# Patient Record
Sex: MALE | Race: WHITE | NOT HISPANIC OR LATINO | URBAN - METROPOLITAN AREA
[De-identification: names, ages, dates, MRNs, and addresses within clinical notes are randomized per-mention and may not be internally consistent; named-entity substitution may affect disease eponyms.]

---

## 2023-08-23 ENCOUNTER — OFFICE (OUTPATIENT)
Dept: URBAN - METROPOLITAN AREA CLINIC 27 | Facility: CLINIC | Age: 63
End: 2023-08-23

## 2023-08-23 VITALS
TEMPERATURE: 97.3 F | HEART RATE: 51 BPM | HEIGHT: 69 IN | WEIGHT: 205 LBS | DIASTOLIC BLOOD PRESSURE: 79 MMHG | SYSTOLIC BLOOD PRESSURE: 152 MMHG

## 2023-08-23 DIAGNOSIS — R10.13 EPIGASTRIC PAIN: ICD-10-CM

## 2023-08-23 DIAGNOSIS — R10.10 UPPER ABDOMINAL PAIN, UNSPECIFIED: ICD-10-CM

## 2023-08-23 DIAGNOSIS — Z87.19 PERSONAL HISTORY OF OTHER DISEASES OF THE DIGESTIVE SYSTEM: ICD-10-CM

## 2023-08-23 PROCEDURE — 99204 OFFICE O/P NEW MOD 45 MIN: CPT | Performed by: INTERNAL MEDICINE

## 2023-08-24 ENCOUNTER — AMBULATORY SURGICAL CENTER (OUTPATIENT)
Dept: URBAN - METROPOLITAN AREA SURGERY 12 | Facility: SURGERY | Age: 63
End: 2023-08-24

## 2023-08-24 ENCOUNTER — AMBULATORY SURGICAL CENTER (OUTPATIENT)
Dept: URBAN - METROPOLITAN AREA SURGERY 12 | Facility: SURGERY | Age: 63
End: 2023-08-24
Payer: COMMERCIAL

## 2023-08-24 ENCOUNTER — OFFICE (OUTPATIENT)
Dept: URBAN - METROPOLITAN AREA PATHOLOGY 2 | Facility: PATHOLOGY | Age: 63
End: 2023-08-24

## 2023-08-24 VITALS
SYSTOLIC BLOOD PRESSURE: 153 MMHG | DIASTOLIC BLOOD PRESSURE: 49 MMHG | DIASTOLIC BLOOD PRESSURE: 43 MMHG | SYSTOLIC BLOOD PRESSURE: 120 MMHG | SYSTOLIC BLOOD PRESSURE: 100 MMHG | RESPIRATION RATE: 15 BRPM | DIASTOLIC BLOOD PRESSURE: 43 MMHG | RESPIRATION RATE: 11 BRPM | DIASTOLIC BLOOD PRESSURE: 48 MMHG | DIASTOLIC BLOOD PRESSURE: 49 MMHG | DIASTOLIC BLOOD PRESSURE: 63 MMHG | OXYGEN SATURATION: 96 % | SYSTOLIC BLOOD PRESSURE: 101 MMHG | TEMPERATURE: 97.3 F | DIASTOLIC BLOOD PRESSURE: 65 MMHG | DIASTOLIC BLOOD PRESSURE: 65 MMHG | HEART RATE: 59 BPM | OXYGEN SATURATION: 98 % | SYSTOLIC BLOOD PRESSURE: 149 MMHG | HEART RATE: 56 BPM | DIASTOLIC BLOOD PRESSURE: 39 MMHG | HEART RATE: 54 BPM | SYSTOLIC BLOOD PRESSURE: 101 MMHG | RESPIRATION RATE: 11 BRPM | RESPIRATION RATE: 9 BRPM | RESPIRATION RATE: 15 BRPM | WEIGHT: 200 LBS | HEART RATE: 53 BPM | HEART RATE: 54 BPM | DIASTOLIC BLOOD PRESSURE: 61 MMHG | DIASTOLIC BLOOD PRESSURE: 39 MMHG | SYSTOLIC BLOOD PRESSURE: 120 MMHG | DIASTOLIC BLOOD PRESSURE: 63 MMHG | DIASTOLIC BLOOD PRESSURE: 48 MMHG | SYSTOLIC BLOOD PRESSURE: 100 MMHG | HEART RATE: 56 BPM | DIASTOLIC BLOOD PRESSURE: 63 MMHG | SYSTOLIC BLOOD PRESSURE: 131 MMHG | HEART RATE: 59 BPM | RESPIRATION RATE: 11 BRPM | RESPIRATION RATE: 15 BRPM | HEART RATE: 53 BPM | OXYGEN SATURATION: 99 % | OXYGEN SATURATION: 96 % | RESPIRATION RATE: 9 BRPM | RESPIRATION RATE: 4 BRPM | DIASTOLIC BLOOD PRESSURE: 65 MMHG | OXYGEN SATURATION: 99 % | DIASTOLIC BLOOD PRESSURE: 61 MMHG | HEIGHT: 69 IN | HEART RATE: 52 BPM | HEART RATE: 53 BPM | DIASTOLIC BLOOD PRESSURE: 39 MMHG | RESPIRATION RATE: 16 BRPM | SYSTOLIC BLOOD PRESSURE: 131 MMHG | SYSTOLIC BLOOD PRESSURE: 149 MMHG | RESPIRATION RATE: 16 BRPM | RESPIRATION RATE: 4 BRPM | SYSTOLIC BLOOD PRESSURE: 131 MMHG | RESPIRATION RATE: 16 BRPM | RESPIRATION RATE: 4 BRPM | WEIGHT: 200 LBS | HEART RATE: 52 BPM | TEMPERATURE: 97.3 F | SYSTOLIC BLOOD PRESSURE: 153 MMHG | DIASTOLIC BLOOD PRESSURE: 43 MMHG | HEART RATE: 54 BPM | SYSTOLIC BLOOD PRESSURE: 97 MMHG | HEART RATE: 55 BPM | HEART RATE: 52 BPM | SYSTOLIC BLOOD PRESSURE: 101 MMHG | OXYGEN SATURATION: 100 % | OXYGEN SATURATION: 96 % | WEIGHT: 200 LBS | OXYGEN SATURATION: 100 % | SYSTOLIC BLOOD PRESSURE: 120 MMHG | HEART RATE: 56 BPM | OXYGEN SATURATION: 99 % | OXYGEN SATURATION: 95 % | OXYGEN SATURATION: 95 % | SYSTOLIC BLOOD PRESSURE: 153 MMHG | OXYGEN SATURATION: 98 % | SYSTOLIC BLOOD PRESSURE: 97 MMHG | DIASTOLIC BLOOD PRESSURE: 49 MMHG | SYSTOLIC BLOOD PRESSURE: 97 MMHG | HEIGHT: 69 IN | HEART RATE: 59 BPM | SYSTOLIC BLOOD PRESSURE: 149 MMHG | DIASTOLIC BLOOD PRESSURE: 48 MMHG | OXYGEN SATURATION: 95 % | TEMPERATURE: 97.3 F | HEART RATE: 55 BPM | RESPIRATION RATE: 9 BRPM | DIASTOLIC BLOOD PRESSURE: 61 MMHG | OXYGEN SATURATION: 98 % | OXYGEN SATURATION: 100 % | HEIGHT: 69 IN | SYSTOLIC BLOOD PRESSURE: 100 MMHG | HEART RATE: 55 BPM

## 2023-08-24 DIAGNOSIS — K31.7 POLYP OF STOMACH AND DUODENUM: ICD-10-CM

## 2023-08-24 DIAGNOSIS — K44.9 DIAPHRAGMATIC HERNIA WITHOUT OBSTRUCTION OR GANGRENE: ICD-10-CM

## 2023-08-24 DIAGNOSIS — K29.80 DUODENITIS WITHOUT BLEEDING: ICD-10-CM

## 2023-08-24 DIAGNOSIS — R10.13 EPIGASTRIC PAIN: ICD-10-CM

## 2023-08-24 PROCEDURE — 43239 EGD BIOPSY SINGLE/MULTIPLE: CPT | Performed by: INTERNAL MEDICINE

## 2023-08-24 PROCEDURE — G8907 PT DOC NO EVENTS ON DISCHARG: HCPCS | Performed by: INTERNAL MEDICINE

## 2023-08-24 PROCEDURE — 88342 IMHCHEM/IMCYTCHM 1ST ANTB: CPT | Performed by: PATHOLOGY

## 2023-08-24 PROCEDURE — 88305 TISSUE EXAM BY PATHOLOGIST: CPT | Performed by: PATHOLOGY

## 2023-08-24 PROCEDURE — 88313 SPECIAL STAINS GROUP 2: CPT | Performed by: PATHOLOGY

## 2023-10-11 RX ORDER — METOPROLOL SUCCINATE 25 MG/1
25 TABLET, EXTENDED RELEASE ORAL DAILY
COMMUNITY
Start: 2023-10-02

## 2023-10-11 RX ORDER — DICYCLOMINE HYDROCHLORIDE 20 MG/1
20 TABLET ORAL 4 TIMES DAILY
COMMUNITY
Start: 2023-07-12 | End: 2024-01-22 | Stop reason: ALTCHOICE

## 2023-10-11 RX ORDER — IPRATROPIUM BROMIDE 21 UG/1
2 SPRAY, METERED NASAL 3 TIMES DAILY
COMMUNITY
Start: 2023-09-19

## 2023-10-11 RX ORDER — OMEPRAZOLE 40 MG/1
40 CAPSULE, DELAYED RELEASE ORAL DAILY
COMMUNITY
Start: 2023-08-24 | End: 2024-02-19 | Stop reason: WASHOUT

## 2023-10-12 ENCOUNTER — OFFICE VISIT (OUTPATIENT)
Dept: SURGERY | Facility: CLINIC | Age: 63
End: 2023-10-12
Payer: COMMERCIAL

## 2023-10-12 VITALS
WEIGHT: 200 LBS | HEIGHT: 69 IN | DIASTOLIC BLOOD PRESSURE: 80 MMHG | SYSTOLIC BLOOD PRESSURE: 128 MMHG | TEMPERATURE: 96.6 F | BODY MASS INDEX: 29.62 KG/M2

## 2023-10-12 DIAGNOSIS — K80.20 CALCULUS OF GALLBLADDER WITHOUT CHOLECYSTITIS WITHOUT OBSTRUCTION: Primary | ICD-10-CM

## 2023-10-12 PROCEDURE — 99203 OFFICE O/P NEW LOW 30 MIN: CPT | Performed by: SURGERY

## 2023-10-12 PROCEDURE — 1036F TOBACCO NON-USER: CPT | Performed by: SURGERY

## 2023-10-12 NOTE — PROGRESS NOTES
Subjective   Patient ID: Vincent Berardinelli is a 63 y.o. male who presents for New Patient Visit (cholelithiasis).  HPI    Review of Systems   All other systems reviewed and are negative.      Objective   Physical Exam    Assessment/Plan        **Portions of this medical record have been created using voice recognition software and may have minor errors which are inherent in voice recognition systems. It has not been fully edited for typographical or grammatical errors**

## 2023-10-12 NOTE — PROGRESS NOTES
Subjective   Patient ID: Vincent Berardinelli is a 63 y.o. male who presents for New Patient Visit (cholelithiasis).    HPI multiple episodes of epigastric, right upper quadrant pain.  The patient had extensive GI work-up, findings were consistent with symptomatic cholelithiasis    Review of Systems currently review of all 10 system is negative.    Physical Exam  Pupils equal bilaterally, mucosa moist, bilateral breath sounds, regular rhythm and rate, abdomen soft, nontender, palpable peripheral pulses, no focal neurological motor deficits.  Musculoskeletal exam within normal limits, ENT exam within normal limits        Objective     Ultrasound consistent with cholelithiasis with circumferential gallbladder wall thickening.  Small cysts in the liver.  Bilirubin within normal limits.  WBC within normal limits.  EGD showed 1 cm hiatal hernia.  Normal mucosa, small polyps in the stomach.    No diagnosis found.   There is no problem list on file for this patient.     No Known Allergies   Medication Documentation Review Audit       Reviewed by Thu Munguia MA (Medical Assistant) on 10/12/23 at 0830      Medication Order Taking? Sig Documenting Provider Last Dose Status   dicyclomine (Bentyl) 20 mg tablet [id number] No Take 1 tablet (20 mg) by mouth 4 times a day. Historical Provider, MD Not Taking Active   ipratropium (Atrovent) 21 mcg (0.03 %) nasal spray [id number] Yes Administer 2 sprays into each nostril 3 times a day. Historical MD Ji Taking Active   metoprolol succinate XL (Toprol-XL) 25 mg 24 hr tablet [id number] Yes Take 1 tablet (25 mg) by mouth once daily. Charanjit Workman MD Taking Active   omeprazole (PriLOSEC) 40 mg DR capsule [id number] Yes Take 1 capsule (40 mg) by mouth once daily. Historical MD Ji Taking Active                    No past medical history on file.  Social History     Tobacco Use   Smoking Status Never   Smokeless Tobacco Never     No family history on file.   No past  surgical history on file.    Assessment/Plan   The patient with symptomatic cholelithiasis.  The patient has indication for laparoscopic, possible open cholecystectomy.  Risks, benefits, alternative treatment were explained to the patient.  All questions were answered.  Informed consent was obtained.        Julito Carson MD

## 2023-11-30 ENCOUNTER — OFFICE VISIT (OUTPATIENT)
Dept: SURGERY | Facility: CLINIC | Age: 63
End: 2023-11-30
Payer: COMMERCIAL

## 2023-11-30 DIAGNOSIS — Z90.49 STATUS POST LAPAROSCOPIC CHOLECYSTECTOMY: Primary | ICD-10-CM

## 2023-11-30 PROCEDURE — 99024 POSTOP FOLLOW-UP VISIT: CPT | Performed by: PHYSICIAN ASSISTANT

## 2023-11-30 PROCEDURE — 1036F TOBACCO NON-USER: CPT | Performed by: PHYSICIAN ASSISTANT

## 2023-11-30 NOTE — PROGRESS NOTES
Subjective   Patient ID: Vincent Berardinelli is a 63 y.o. male who presents for Post-op (Lap ryan w grams poss open done on 11/15/23).    HPI  This is a 63-year-old male status post a laparoscopic cholecystectomy.  Patient is doing well without complaints.  He has no pain.  No nausea no vomiting.  Is moving his bowels well.  Is not having any fevers.      Review of Systems  Negative other than mentioned in HPI    ENT: No earache, no sore throat, no nosebleeds  Cardiovascular: No chest pain, no shortness of breath, no leg pain, no edema  Respiratory: No shortness of breath on exertion, no wheezing  Gastrointestinal: No abdominal pain, no melena, no nausea, vomiting and/or diarrhea  Musculoskeletal: No pain moving all extremities, no back pain ambulating normally  Skin: No rashes, no lesions, and no skin changes  Neuro: No headache, no confusion, no numbness and tingling  Psychiatric, normal mood, not suicidal, not homicidal, feeling good          Physical Exam  Eyes: Conjunctiva non -icteric and eye lids are without obvious rash or drooping. Pupils are symmetric.   Ears, Nose, Mouth, and Throat: External ears and nose appear to be without deformity or rash. No lesions or masses noted. Hearing is grossly intact.   Neck:. No JVD noted, tracheal position is midline. No thyromegaly, no thyroid nodules  Head and Face: Examination of the head and face revealed no abnormalities.   Respiratory: No gasping or shortness of breath noted, no use of accessory muscles noted. Clear to auscultate bilaterally  Cardiovascular: Examination for edema is normal. Regular rate and rhythm S1 S2 without murmurs  GI: Abdomen non tender to palpation, bowel sounds present no hepatosplenomegaly visions are clean dry and intact no erythema no swelling no drainage.  Glue removed today.  Skin: No rashes or open lesions/ulcers identified on skin.   Musk: Digits/nails show no clubbing or cyanosis. No asymmetry or masses noted of the musculature.  Examination of the muscles/joints/bones show normal range of motion. Gait is grossly normally.   Neurologic: Cranial nerves II- XII intact, motor strength 5/5 muscle strength of the lower extremities bilaterally and equal.     Objective     No diagnosis found.   There is no problem list on file for this patient.     No Known Allergies   Medication Documentation Review Audit       Reviewed by Thu Munguia MA (Medical Assistant) on 11/30/23 at 1055      Medication Order Taking? Sig Documenting Provider Last Dose Status   dicyclomine (Bentyl) 20 mg tablet [id number] No Take 1 tablet (20 mg) by mouth 4 times a day. Historical Provider, MD Not Taking Active   ipratropium (Atrovent) 21 mcg (0.03 %) nasal spray [id number] No Administer 2 sprays into each nostril 3 times a day. Historical Provider, MD Taking Active   metoprolol succinate XL (Toprol-XL) 25 mg 24 hr tablet [id number] No Take 1 tablet (25 mg) by mouth once daily. Historical Provider, MD Taking Active   omeprazole (PriLOSEC) 40 mg DR capsule [id number] No Take 1 capsule (40 mg) by mouth once daily. Historical Provider, MD Taking Active                    History reviewed. No pertinent past medical history.  Social History     Tobacco Use   Smoking Status Never   Smokeless Tobacco Never     No family history on file.   History reviewed. No pertinent surgical history.    Assessment/Plan   No lifting over 10 to 12 pounds for 4 weeks  No swimming pools hot tubs or lakes for 2 weeks  You may ride a stationary bike, you may use a treadmill, you may walk outside.  No squats, sit ups or lunges  You may drive a car  Follow-up as needed     Encounter Diagnosis   Name Primary?    Status post laparoscopic cholecystectomy Yes     I have reviewed all data including labs,radiologic and previous reports.          **Portions of this medical record have been created using voice recognition software and may have minor errors which are inherent in voice recognition systems. It has  not been fully edited for typographical or grammatical errors**

## 2023-12-13 ENCOUNTER — OFFICE (OUTPATIENT)
Dept: URBAN - METROPOLITAN AREA CLINIC 27 | Facility: CLINIC | Age: 63
End: 2023-12-13
Payer: COMMERCIAL

## 2023-12-13 VITALS — TEMPERATURE: 97.5 F | HEIGHT: 69 IN | WEIGHT: 194 LBS

## 2023-12-13 DIAGNOSIS — R10.13 EPIGASTRIC PAIN: ICD-10-CM

## 2023-12-13 PROCEDURE — 99214 OFFICE O/P EST MOD 30 MIN: CPT | Performed by: INTERNAL MEDICINE

## 2024-01-22 ENCOUNTER — OFFICE VISIT (OUTPATIENT)
Dept: OTOLARYNGOLOGY | Facility: CLINIC | Age: 64
End: 2024-01-22
Payer: COMMERCIAL

## 2024-01-22 ENCOUNTER — EVALUATION (OUTPATIENT)
Dept: SPEECH THERAPY | Facility: CLINIC | Age: 64
End: 2024-01-22
Payer: COMMERCIAL

## 2024-01-22 VITALS — TEMPERATURE: 96.8 F | WEIGHT: 195.7 LBS | BODY MASS INDEX: 28.9 KG/M2

## 2024-01-22 DIAGNOSIS — J38.02 BILATERAL VOCAL CORD PARESIS: Primary | ICD-10-CM

## 2024-01-22 DIAGNOSIS — R09.89 CHRONIC THROAT CLEARING: ICD-10-CM

## 2024-01-22 DIAGNOSIS — R49.0 HOARSENESS: ICD-10-CM

## 2024-01-22 DIAGNOSIS — R09.82 PND (POST-NASAL DRIP): ICD-10-CM

## 2024-01-22 DIAGNOSIS — R49.0 HOARSENESS OF VOICE: Primary | ICD-10-CM

## 2024-01-22 DIAGNOSIS — J38.3 GLOTTIC INSUFFICIENCY: ICD-10-CM

## 2024-01-22 DIAGNOSIS — J38.02 BILATERAL VOCAL CORD PARESIS: ICD-10-CM

## 2024-01-22 PROCEDURE — 1036F TOBACCO NON-USER: CPT | Performed by: OTOLARYNGOLOGY

## 2024-01-22 PROCEDURE — 31579 LARYNGOSCOPY TELESCOPIC: CPT | Performed by: OTOLARYNGOLOGY

## 2024-01-22 PROCEDURE — 92524 BEHAVRAL QUALIT ANALYS VOICE: CPT | Mod: GN

## 2024-01-22 PROCEDURE — 99204 OFFICE O/P NEW MOD 45 MIN: CPT | Performed by: OTOLARYNGOLOGY

## 2024-01-22 ASSESSMENT — PAIN - FUNCTIONAL ASSESSMENT: PAIN_FUNCTIONAL_ASSESSMENT: 0-10

## 2024-01-22 ASSESSMENT — PAIN SCALES - GENERAL: PAINLEVEL_OUTOF10: 0 - NO PAIN

## 2024-01-22 NOTE — PROGRESS NOTES
Speech-Language Pathology    Voice Evaluation    Patient Name: Vincent Berardinelli  MRN: [id number]  Today's Date: 1/22/2024     Time Calculation  Start Time: 1010  Stop Time: 1040  Time Calculation (min): 30 min      Current Problem:  Patient Active Problem List   Diagnosis    Bilateral vocal cord paresis    Hoarseness     Pain Assessment:  Pain Assessment: 0-10  Pain Score: 0 - No pain    IMPRESSIONS  Patient presents with dysphonia 2/2 a diagnosis of bilateral vocal cord paresis (L > R) and compensatory MTD. Patient appears to be a candidate for therapy which will target vocal wellness and voice rebalancing.    Voice quality based on the GRBAS scale: 0=absent; 1=mild; 2=moderate; 3=severe    rdGrdrrdarddrderd:rd rd3rd Roughness: 2    Breathiness: 2    Asthenia: 0    Strain: 2    CONTRIBUTING FACTORS  Supraglottic compression/MTD  Decreased neuromuscular control of speech/swallow muscles  Habitual behaviors that misuse/abuse the voice  Abnormal vibratory mechanism secondary to physical changes    SUBJECTIVE  HISTORY/REASON FOR REFERRAL  This is a request for consultation from Dr. Solomon for Vincent Berardinelli, who is a 63 y.o. male presenting for an initial visit for hoarseness, throat clearing, cough, and runny nose.    Hoarseness:  The patient states that he has to clear his voice frequently. He works in sales with high professional voice demands. He also often loses his voice during meetings. Nothing has been helping to improve symptoms. Onset was over 1 year ago. The hoarseness occurred gradually. Voice currently is characterized as moderate breathy hoarseness with decreased volume. This impacts his work activities. He had an intubation in November 2023, which did not affect his voice.     Swallow: Denies swallowing issues. He was started on omeprazole in August 2023 and has been taking it daily.     Breathing: Denies breathing issues except for occasional shortness of breath that but is not impacting his daily  activities.     Other factors:  Throat clearing, postnasal drip. He has been using Atrovent for postnasal drip. He endorses some hand tremors that have not affected his handwriting. He recently had a normal hearing test.      ASSESSMENT  PERCEPTUAL VOICE FEATURES    Intonation: WFL  Loudness: reduced; able to increase with reported effort  Nasal resonance: WFL    Additional vocal characteristics noted included:  Throat clears    FLEXIBLE LARYNGOSCOPY WITH STROBOSCOPY   Vocal fold mobility: normal  Glottic closure: incomplete with a moderate mid glottal gap extending the full length of the vocal cords  Mucosal wave: increased amplitude bilaterally (L > R)  Secretions: unremarkable  Supraglottic compression: AP and FVC (R > L)  Vascularity: bilateral posterior edema and erythema  Mucosal edge: smooth bilaterally  Interarytenoid edema: none  Pharyngeal contraction: reduced (L > R)    ADDITIONAL OBSERVATIONS  Improved glottic sufficiency with cued increased phonatory effort    TREATMENT  Instructed patient this date in:  cough/throat clear reduction techniques (effortful swallow, RTB, Joselyn, silent cough/clear)  seltzer or warm water gargle technique to safely remove mucus  vocal wellness strategies to reduce cough/throat clear triggers and phono trauma  reflux lifestyle modifications    Clinician modeled all techniques and accurate patient follow through confirmed.  Handouts emailed/provided to facilitate optimal home carryover.    PLAN OF CARE  Frequency: 2-4 x/month  Duration: 2 months    LONG TERM GOALS  Improve overall vocal health to foster increased participation levels at home, work and in the community environment.    SHORT TERM GOALS  Patient will increase vocal wellness and decrease phono trauma in adherence with clinician prescribed vocal hygiene and wellness program per patient report 80% of his/her day.  Patient will increase ability to produce voice without tension within 5 minute conversational task x  80% accuracy as judged by clinician observation and/or patient report.  Patient will demonstrate independent use of voice techniques x 80% accuracy.  Patient will increase the balance/strength of the respiratory/laryngeal musculature x 80% accuracy.    RECOMMENDATIONS FOR THERAPEUTIC INTERVENTIONS  Speech/voice exercises  Vocal hygiene program    POTENTIAL FOR IMPROVEMENT  Guarded     FACTORS AFFECTING PROGNOSIS  Diagnosis pf a moderate mid glottal insufficiency    DISCUSSED PLAN OF CARE WITH patient and MD  DISCUSSED RISK/BENEFITS WITH patient  PATIENT/CAREGIVER AGREEABLE WITH PLAN.

## 2024-01-22 NOTE — PROGRESS NOTES
Reason For Consult  New patient visit for hoarseness, throat clearing, cough, postnasal drip    HISTORY OF PRESENT ILLNESS:  This is a request for consultation from Dr. Solomon for Vincent Berardinelli, who is a 63 y.o. male presenting for an initial visit for hoarseness, throat clearing, cough, and runny nose.    Hoarseness:  The patient states that he has to clear his voice frequently. 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 He also often loses his voice during meetings. Nothing has been helping to improve symptoms.  Onset was over 1 year ago. The hoarseness occurred gradually. Voice has improved/worsened or stabilized since onset.  Voice currently is characterized as moderate breathy hoarseness with decreased volume. This impacts his work activities. He had an intubation in November 2023, which did not affect his voice.    Swallow: Denies swallowing issues.  Breathing: Denies breathing issues except for occasional shortness of breath that but is not impacting his daily activities.    Other factors:  Throat clearing, postnasal drip.  He has been using Atrovent for postnasal drip. He denies swallowing issues. He was started on omeprazole in August 2023 and has been taking it daily. He endorses some hand tremors that have not affected his handwriting. He recently had a normal hearing test. Coughing is worse      Past Medical History  He has no past medical history on file.    Surgical History  He has no past surgical history on file.  He has a history of  cholecystectomy     Social History  He reports that he has never smoked. He has never used smokeless tobacco. He reports that he does not drink alcohol and does not use drugs.    Allergies  Patient has no known allergies.    Review of Systems  All 10 systems were reviewed and negative except for above.      Physical Exam  CONSTITUTIONAL: Well developed, well nourished.    VOICE: Moderate breathy hoarseness with decreased volume  RESPIRATION:  Breathing comfortably, no stridor.    NEURO: Alert and oriented x3, cranial nerves II-XII intact and symmetric bilaterally.    EARS: Normal external ears, external auditory canals, decreased hearing to conversational voice.    NOSE: External nose midline, anterior rhinoscopy is normal with limited visualization to the anterior aspect of the interior turbinates. No lesions noted.     ORAL CAVITY/OROPHARYNX/LIPS: Normal mucous membranes, normal floor of mouth/tongue/OP, no masses or lesions are noted.    SKIN: Neck skin is without scar or injury.    PSYCH: Alert and oriented with appropriate mood and affect.        Last Recorded Vitals  Temperature 36 °C (96.8 °F), weight 88.8 kg (195 lb 11.2 oz).    Procedure  PROCEDURE NOTE:  Recommended flexible laryngoscopy/stroboscopy.  Risks, benefits,  and alternatives were explained.  They wish to proceed and provide verbal consent.     PROCEDURE:  Flexible laryngoscopy with stroboscopy, CPT 62100     POSTPROCEDURE DIAGNOSIS:    INDICATIONS: Inability to tolerate mirror exam or abnormal findings on mirror, Flexible Laryngoscopy/Stroboscopy performed to assess one of the followin. Diagnosis of symptomatic disorder involving the voice, swallow, upper aerodigestive tract, including ZENIA disorders, or  2. Preoperative evaluation of vocal cord function for individuals undergoing surgery where the RLN or vagus nerves are at risk of injury, or  3. Further evaluation of abnormalities of the upper aerodigestive tract discovered by another modality, such as CT, MRI, bronchoscopy or EGD    Description of Procedure:    After adequate afrin and lidocaine spray, I advanced the endoscope.  Visualization of the nasopharynx, vallecula, posterior pharyngeal walls, pyriform, epiglottis and post cricoid areas was unremarkable.  The following laryngeal findings were noted:    vocal cord movement was normal but asymmetric  closure was Moderate-size mid-glottal gap that runs the length of the  vocal cord somewhat variable with closure with increased loudness  Mucosal wave was increased bilaterally  Compression was increased AP > FVC   edema was  not present  interarytenoid edema is mild  lesions were not present  the subglottis was widely patent  Pharyngeal wall squeeze was decreased on the left > right    Procedure well tolerated.    ASSESSMENT AND PLAN:   This is an initial visit for chronic hoarseness, throat clearing, cough, postnasal drip with clinical findings notable for bilateral vocal cord paresis.  Secondary issues identified and managed on this patient visit include: throat clearing, and irritable larynx     Diagnoses are exacerbated by: discoordination and hearing changes    We discussed the treatment options to include, medical and surgical options.  We have decided to proceed as follows:  Recommended speech therapy with Jacquelin Chun.  Patient may be candidate for vocal cord injection in the future.  Follow up in pending the outcome of therapy.       Scribe Attestation  By signing my name below, IRosendo Scribe, attest that this documentation has been prepared under the direction and in the presence of Brigida Gee MD.

## 2024-01-22 NOTE — LETTER
January 22, 2024     Curly Marcus MD  [address]  [place]  [facility] [address]    Patient: Vincent Berardinelli   YOB: 1960   Date of Visit: 1/22/2024       Dear Dr. Curly Marcus MD:    Thank you for referring Vincent Berardinelli to me for evaluation. Below are my notes for this consultation.  If you have questions, please do not hesitate to call me. I look forward to following your patient along with you.       Sincerely,     Brigida Gee MD      CC: Miguel Solomon MD  ______________________________________________________________________________________    Reason For Consult  No chief complaint on file.    HISTORY OF PRESENT ILLNESS:  This is a request for consultation from *** for Vincent Berardinelli, who is a 63 y.o. male presenting for an initial visit for ***.  The patient reports ***.    Hoarseness:  Onset was ***.  This occurred suddenly/gradually.  Voice has improved/worsened or stabilized since onset.  Voice currently is characterized as ***.  Impacting daily living related to job/work/school/activities.    Swallow:  Is/is not impacted.  Worse with liquids/solids. Associated with coughing.  Pneumonias?  Breathing:  Issues/No issues.      Other factors:  throat clearing/cough.  Triggered by:  eating/talking/walking/excercising. Coughing is worse day/night/am or pm.  Snoring ***.   Associated fatigue.  Prior sleep study.    Past Medical History  He has no past medical history on file.  Surgical History  He has no past surgical history on file.     Social History  He reports that he has never smoked. He has never used smokeless tobacco. He reports that he does not drink alcohol and does not use drugs.    Allergies  Patient has no known allergies.    Review of Systems  All 10 systems were reviewed and negative except for above.      Physical Exam  CONSTITUTIONAL: Well developed, well nourished.    VOICE:   RESPIRATION: Breathing comfortably, no stridor.     NEURO: Alert and oriented x3, cranial nerves II-XII intact and symmetric bilaterally.    EARS: Normal external ears, external auditory canals, *** hearing to conversational voice.    NOSE: External nose midline, anterior rhinoscopy is normal with limited visualization to the anterior aspect of the interior turbinates. No lesions noted.     ORAL CAVITY/OROPHARYNX/LIPS: Normal mucous membranes, normal floor of mouth/tongue/OP, no masses or lesions are noted.    SKIN: Neck skin is *** scar or injury.    PSYCH: Alert and oriented with appropriate mood and affect.        Last Recorded Vitals  There were no vitals taken for this visit.    Procedure  PROCEDURE NOTE:  Recommended flexible laryngoscopy/stroboscopy.  Risks, benefits,  and alternatives were explained.  They wish to proceed and provide verbal consent.     PROCEDURE:  Flexible Laryngoscopy, CPT 66974 *** Flexible laryngoscopy with stroboscopy, CPT 93656 ***    POSTPROCEDURE DIAGNOSIS:    INDICATIONS: Inability to tolerate mirror exam or abnormal findings on mirror, Flexible Laryngoscopy/Stroboscopy performed to assess one of the followin. Diagnosis of symptomatic disorder involving the voice, swallow, upper aerodigestive tract, including ZENIA disorders, or  2. Preoperative evaluation of vocal cord function for individuals undergoing surgery where the RLN or vagus nerves are at risk of injury, or  3. Further evaluation of abnormalities of the upper aerodigestive tract discovered by another modality, such as CT, MRI, bronchoscopy or EGD    Description of Procedure:    After adequate afrin and lidocaine spray, I advanced the endoscope.  Visualization of the nasopharynx, vallecula, posterior pharyngeal walls, pyriform, epiglottis and post cricoid areas was unremarkable.  The following laryngeal findings were noted:    vocal cord movement was ***  closure was ***  Mucosal wave was/was not assessed *** increased/decreased  Compression was increased AP  FVC  ***  edema was  ***  interarytenoid edema ***  lesions were ***  the subglottis was widely patent  Pharyngeal wall squeeze was ***    Procedure well tolerated.   Relevant Results       Radiology, Laboratory and Pathology ***    ASSESSMENT AND PLAN:   This is an initial visit for *** with clinical findings notable for ***  Secondary issues identified and managed on this patient visit include:    Diagnoses are exacerbated by:    We discussed the treatment options to include, medical and surgical options.  We have decided to proceed as follows:   ***      Scribe Attestation  By signing my name below, I, Tamiko Fraser, attest that this documentation has been prepared under the direction and in the presence of Brigida Gee MD.

## 2024-01-23 ENCOUNTER — OFFICE VISIT (OUTPATIENT)
Dept: SURGERY | Facility: CLINIC | Age: 64
End: 2024-01-23
Payer: COMMERCIAL

## 2024-01-23 DIAGNOSIS — K40.20 BILATERAL INGUINAL HERNIA WITHOUT OBSTRUCTION OR GANGRENE, RECURRENCE NOT SPECIFIED: ICD-10-CM

## 2024-01-23 LAB
NON-UH HIE BUN: 18 MG/DL (ref 9–23)
NON-UH HIE CREATININE: 1 MG/DL (ref 0.6–1.1)
NON-UH HIE GFR AA: >60
NON-UH HIE GLOMERULAR FILTRATION RATE: >60 ML/MIN/1.73M?

## 2024-01-23 PROCEDURE — 1036F TOBACCO NON-USER: CPT | Performed by: PHYSICIAN ASSISTANT

## 2024-01-23 PROCEDURE — 99214 OFFICE O/P EST MOD 30 MIN: CPT | Performed by: PHYSICIAN ASSISTANT

## 2024-01-23 NOTE — PROGRESS NOTES
Subjective   Patient ID: Vincent Berardinelli is a 63 y.o. male who presents for Hernia.    HPI  Is a 63-year-old gentleman who is just 2 months status post laparoscopic cholecystectomy.  Patient states about a month ago he started noticing a right inguinal bulge he has to push it in and out he says it feels like there is water in there.  It causes him discomfort.  Patient has a history of a previous midline appendectomy where he has a old incision as well.  He is not having any urinary difficulties he does not have any prostate difficulties    Review of Systems  Negative other than mentioned in HPI    ENT: No earache, no sore throat, no nosebleeds  Cardiovascular: No chest pain, no shortness of breath, no leg pain, no edema  Respiratory: No shortness of breath on exertion, no wheezing  Gastrointestinal: No abdominal pain, no melena, no nausea, vomiting and/or diarrhea  Musculoskeletal: No pain moving all extremities, no back pain ambulating normally  Skin: No rashes, no lesions, and no skin changes  Neuro: No headache, no confusion, no numbness and tingling  Psychiatric, normal mood, not suicidal, not homicidal, feeling good        Physical Exam  Eyes: Conjunctiva non -icteric and eye lids are without obvious rash or drooping. Pupils are symmetric.   Ears, Nose, Mouth, and Throat: External ears and nose appear to be without deformity or rash. No lesions or masses noted. Hearing is grossly intact.   Neck:. No JVD noted, tracheal position is midline. No thyromegaly, no thyroid nodules  Head and Face: Examination of the head and face revealed no abnormalities.   Respiratory: No gasping or shortness of breath noted, no use of accessory muscles noted. Clear to auscultate bilaterally  Cardiovascular: Examination for edema is normal. Regular rate and rhythm S1 S2 without murmurs  GI: Abdomen non tender to palpation, bowel sounds present no hepatosplenomegaly  Inguinal:.  Patient does have a reducible right inguinal hernia  present and possible left as well.  Both testes are down  Skin: No rashes or open lesions/ulcers identified on skin.   Musk: Digits/nails show no clubbing or cyanosis. No asymmetry or masses noted of the musculature. Examination of the muscles/joints/bones show normal range of motion. Gait is grossly normally.   Neurologic: Cranial nerves II- XII intact, motor strength 5/5 muscle strength of the lower extremities bilaterally and equal.      Objective     No diagnosis found.   Patient Active Problem List   Diagnosis    Bilateral vocal cord paresis    Hoarseness      No Known Allergies   Medication Documentation Review Audit       Reviewed by Thu Munguia MA (Medical Assistant) on 01/23/24 at 0846      Medication Order Taking? Sig Documenting Provider Last Dose Status   Discontinued 01/22/24 0822   ipratropium (Atrovent) 21 mcg (0.03 %) nasal spray [id number] No Administer 2 sprays into each nostril 3 times a day. Historical Provider, MD Taking Active   metoprolol succinate XL (Toprol-XL) 25 mg 24 hr tablet [id number] No Take 1 tablet (25 mg) by mouth once daily. Historical Provider, MD Taking Active   omeprazole (PriLOSEC) 40 mg DR capsule [id number] No Take 1 capsule (40 mg) by mouth once daily. Historical Provider, MD Taking Active                    History reviewed. No pertinent past medical history.  Social History     Tobacco Use   Smoking Status Never   Smokeless Tobacco Never     No family history on file.   History reviewed. No pertinent surgical history.    Assessment/Plan   Patient does have a right inguinal hernia present on exam and possibly left.  Patient will require CT pelvis with IV and oral contrast to assess if he has bilateral inguinal hernias.  Patient will need to wait to have another abdominal surgery for at least 3 months before we can fix his hernias by the time we get the CT and follow-up appointment hopefully he can get them fixed within a few weeks.      Encounter Diagnosis   Name  Primary?    Bilateral inguinal hernia without obstruction or gangrene, recurrence not specified      I have reviewed all data including labs,radiologic and previous reports.      **Portions of this medical record have been created using voice recognition software and may have minor errors which are inherent in voice recognition systems. It has not been fully edited for typographical or grammatical errors**

## 2024-01-30 ENCOUNTER — TREATMENT (OUTPATIENT)
Dept: SPEECH THERAPY | Facility: CLINIC | Age: 64
End: 2024-01-30
Payer: COMMERCIAL

## 2024-01-30 DIAGNOSIS — J38.02 BILATERAL VOCAL CORD PARESIS: Primary | ICD-10-CM

## 2024-01-30 DIAGNOSIS — R49.0 HOARSENESS: ICD-10-CM

## 2024-01-30 PROCEDURE — 92507 TX SP LANG VOICE COMM INDIV: CPT | Mod: GN

## 2024-01-30 ASSESSMENT — PAIN SCALES - GENERAL: PAINLEVEL_OUTOF10: 0 - NO PAIN

## 2024-01-30 ASSESSMENT — PAIN - FUNCTIONAL ASSESSMENT: PAIN_FUNCTIONAL_ASSESSMENT: 0-10

## 2024-01-30 NOTE — PROGRESS NOTES
"Speech-Language Pathology    Outpatient Speech-Language Pathology Treatment     Patient Name: Vincent Berardinelli  MRN: [id number]  Today's Date: 1/30/2024     Time Calculation  Start Time: 0900  Stop Time: 0940  Time Calculation (min): 40 min      Current Problem:   1. Bilateral vocal cord paresis        2. Hoarseness          Pain Assessment:  Pain Assessment: 0-10  Pain Score: 0 - No pain    SUBJECTIVE  Patient alert and oriented and ready to participate in office visit this date.    OBJECTIVE  LONG TERM GOAL  The patient will improve coordination between the subsystems of respiration, phonation and articulation for functional speech production with a variety of communication partners across environmental and situational contexts.     SHORT TERM GOALS  Patient will increase vocal wellness and decrease phono trauma in adherence with clinician prescribed vocal hygiene and wellness program per patient report 80% of his/her day.  Patient will increase ability to produce voice without tension within 5 minute conversational task x 80% accuracy as judged by clinician observation and/or patient report.  Patient will demonstrate independent use of voice techniques x 80% accuracy.  Patient will increase the balance/strength of the respiratory/laryngeal musculature x 80% accuracy.    ASSESSMENT  Instructed patient this date in:  cough/throat clear reduction techniques (effortful swallow, RTB, Joselyn, silent cough/clear)  seltzer or warm water gargle technique to safely remove mucus  vocal wellness strategies to reduce cough/throat clear triggers and phono trauma    Initiated voice retraining to improve volume and clarity via increased coordination of respiration, phonation and articulation via phonatory resistance exercise training (PhoRTE). Tasks include sustaining \"ah\" for as long as possible with increased volume (average this date: 10 seconds and 78 dB), producing full pitch range, reciting sentences using a loud, " slightly higher pitch voice and reciting sentences using a loud, slightly lower pitched voice. Multimodality cueing, instruction in proper posture and repeated trials improved patient performance accuracy. Patient noted his/her voice quality was smoother with improved volume for each task. Encouraged home practice twice a day for the directed number of repetitions.     Introduced CTT training as well targeting increased breath support with speech via phrasing and over-articulation.     Clinician modeled all techniques and accurate patient follow through confirmed.  Handouts provided to facilitate optimal home carryover.    PLAN  Continue current plan of care  Progress with POC, as tolerated  Discussed POC with patient  Patient/caregiver agreeable with POC

## 2024-02-06 ENCOUNTER — OFFICE VISIT (OUTPATIENT)
Dept: SURGERY | Facility: CLINIC | Age: 64
End: 2024-02-06
Payer: COMMERCIAL

## 2024-02-06 DIAGNOSIS — K40.90 REDUCIBLE LEFT INGUINAL HERNIA: ICD-10-CM

## 2024-02-06 DIAGNOSIS — K40.20 NON-RECURRENT BILATERAL INGUINAL HERNIA WITHOUT OBSTRUCTION OR GANGRENE: Primary | ICD-10-CM

## 2024-02-06 DIAGNOSIS — K40.30 INCARCERATED RIGHT INGUINAL HERNIA: ICD-10-CM

## 2024-02-06 PROCEDURE — 1036F TOBACCO NON-USER: CPT | Performed by: PHYSICIAN ASSISTANT

## 2024-02-06 PROCEDURE — 99214 OFFICE O/P EST MOD 30 MIN: CPT | Performed by: PHYSICIAN ASSISTANT

## 2024-02-06 NOTE — PROGRESS NOTES
Subjective   Patient ID: Vincent Berardinelli is a 63 y.o. male who presents for Follow-up (CT scan results).    HPI  63-year-old gentleman I saw a few weeks ago for right inguinal bulge possible left inguinal hernia.  Patient went for CAT scan and it is confirmed on CAT scan bilateral inguinal hernias right greater than left.  Patient is here to discuss surgical options.  Patient is status post lap ryan on November 15, 2023.      Review of Systems  Negative other than mentioned in HPI    ENT: No earache, no sore throat, no nosebleeds  Cardiovascular: No chest pain, no shortness of breath, no leg pain, no edema  Respiratory: No shortness of breath on exertion, no wheezing  Gastrointestinal: No abdominal pain, no melena, no nausea, vomiting and/or diarrhea  Right groin pain  Musculoskeletal: No pain moving all extremities, no back pain ambulating normally  Skin: No rashes, no lesions, and no skin changes  Neuro: No headache, no confusion, no numbness and tingling  Psychiatric, normal mood, not suicidal, not homicidal, feeling good          Physical Exam  Physical Exam   Eyes: Conjunctiva non -icteric and eye lids are without obvious rash or drooping. Pupils are symmetric.   Ears, Nose, Mouth, and Throat: External ears and nose appear to be without deformity or rash. No lesions or masses noted. Hearing is grossly intact.   Neck:. No JVD noted, tracheal position is midline. No thyromegaly, no thyroid nodules  Head and Face: Examination of the head and face revealed no abnormalities.   Respiratory: No gasping or shortness of breath noted, no use of accessory muscles noted. Clear to auscultate bilaterally  Cardiovascular: Examination for edema is normal. Regular rate and rhythm S1 S2 without murmurs  GI: Abdomen non tender to palpation, bowel sounds present no   Right groin bulge,incarcerated hernia, left smaller and reducilble   Both tests down  Skin: No rashes or open lesions/ulcers identified on skin.   Musk:  Digits/nails show no clubbing or cyanosis. No asymmetry or masses noted of the musculature. Examination of the muscles/joints/bones show normal range of motion. Gait is grossly normally.   Neurologic: Cranial nerves II- XII intact, motor strength 5/5 muscle strength of the lower extremities bilaterally and equal.      Objective     No diagnosis found.   Patient Active Problem List   Diagnosis    Bilateral vocal cord paresis    Hoarseness      No Known Allergies   Medication Documentation Review Audit       Reviewed by Thu Munguia MA (Medical Assistant) on 02/06/24 at 0827      Medication Order Taking? Sig Documenting Provider Last Dose Status   ipratropium (Atrovent) 21 mcg (0.03 %) nasal spray [id number] No Administer 2 sprays into each nostril 3 times a day. Historical Provider, MD Taking Active   metoprolol succinate XL (Toprol-XL) 25 mg 24 hr tablet [id number] No Take 1 tablet (25 mg) by mouth once daily. Historical Provider, MD Taking Active   omeprazole (PriLOSEC) 40 mg DR capsule [id number] No Take 1 capsule (40 mg) by mouth once daily. Historical Provider, MD Taking Active                    History reviewed. No pertinent past medical history.  Social History     Tobacco Use   Smoking Status Never   Smokeless Tobacco Never     No family history on file.   Past Surgical History:   Procedure Laterality Date    CHOLECYSTECTOMY         Assessment/Plan   Today we had a discussion about robotic assisted  bilateral inguinal hernia repair with mesh.  Patient was informed that this is an outpatient surgery.  The surgery takes 1 to 1-1/2 hours.  There will be 3 small incisions or possible opened ,  requires a general anesthesia.  Patient will need a ride to and from the hospital.  Risk and benefits such as bleeding and infection were discussed as well.  Surgeries were described in detail.  Patient had complete understanding.  All questions were answered.  Patient would like to proceed.     Encounter Diagnoses   Name  Primary?    Non-recurrent bilateral inguinal hernia without obstruction or gangrene Yes    Incarcerated right inguinal hernia     Reducible left inguinal hernia      I have reviewed all data including labs,radiologic and previous reports.    **Portions of this medical record have been created using voice recognition software and may have minor errors which are inherent in voice recognition systems. It has not been fully edited for typographical or grammatical errors**

## 2024-02-19 ENCOUNTER — TREATMENT (OUTPATIENT)
Dept: SPEECH THERAPY | Facility: CLINIC | Age: 64
End: 2024-02-19
Payer: COMMERCIAL

## 2024-02-19 DIAGNOSIS — J38.02 BILATERAL VOCAL CORD PARESIS: Primary | ICD-10-CM

## 2024-02-19 DIAGNOSIS — R49.0 HOARSENESS: ICD-10-CM

## 2024-02-19 PROCEDURE — 92507 TX SP LANG VOICE COMM INDIV: CPT | Mod: GN

## 2024-02-19 ASSESSMENT — PAIN - FUNCTIONAL ASSESSMENT: PAIN_FUNCTIONAL_ASSESSMENT: 0-10

## 2024-02-19 ASSESSMENT — PAIN SCALES - GENERAL: PAINLEVEL_OUTOF10: 0 - NO PAIN

## 2024-02-19 NOTE — PROGRESS NOTES
Speech-Language Pathology    Outpatient Speech-Language Pathology Treatment     Patient Name: Vincent Berardinelli  MRN: [id number]  Today's Date: 2/19/2024     Time Calculation  Start Time: 0900  Stop Time: 0935  Time Calculation (min): 35 min      Current Problem:   1. Bilateral vocal cord paresis        2. Hoarseness          Pain Assessment:  Pain Assessment: 0-10  Pain Score: 0 - No pain     SUBJECTIVE  Patient alert and oriented and ready to participate in office visit this date.     OBJECTIVE  LONG TERM GOAL  The patient will improve coordination between the subsystems of respiration, phonation and articulation for functional speech production with a variety of communication partners across environmental and situational contexts.      SHORT TERM GOALS  Patient will increase vocal wellness and decrease phono trauma in adherence with clinician prescribed vocal hygiene and wellness program per patient report 80% of his/her day. MET  Patient will increase ability to produce voice without tension within 5 minute conversational task x 80% accuracy as judged by clinician observation and/or patient report.  Patient will demonstrate independent use of voice techniques x 80% accuracy. MET  Patient will increase the balance/strength of the respiratory/laryngeal musculature x 80% accuracy.     ASSESSMENT  Patient reports good compliance with HEP including vocal wellness and daily practice of PhoRTE. He has stopped clearing his throat by at least 80%. Although he does sense secretions all the time he his now swallowing instead of clearing. Patient is now producing speech with increased diaphragmatic breath support which has improved his voice quality and strength per his report. Good feedback from others as well. He feels the exercises and use of compensatory strategies has improved his vocal quality, however, it requires mindfulness and increased effort. He still feels vocally fatigued at the end of days requiring high  "voice demands. He wants to continued to work on the exercise program but he is strongly considering vocal cord injections. Therefore, clinician provided education pertaining to in office injections and a handout was provided.      Monitored performance with phonatory resistance exercise training (PhoRTE). Tasks include sustaining \"ah\" for as long as possible with increased volume, producing full pitch range, reciting sentences using a loud, slightly higher pitch voice and reciting sentences using a loud, slightly lower pitched voice. Feedback required to increased step 3 but otherwise feedback not needed. Encouraged continued home practice twice a day for the directed number of repetitions.       PLAN  Continue current plan of care  Progress with POC, as tolerated  Discussed POC with patient  Patient/caregiver agreeable with POC   "

## 2024-03-29 ENCOUNTER — OFFICE VISIT (OUTPATIENT)
Dept: SURGERY | Facility: CLINIC | Age: 64
End: 2024-03-29
Payer: COMMERCIAL

## 2024-03-29 DIAGNOSIS — Z09 STATUS POST BILATERAL INGUINAL HERNIA REPAIR, FOLLOW-UP EXAM: Primary | ICD-10-CM

## 2024-03-29 PROCEDURE — 1036F TOBACCO NON-USER: CPT | Performed by: PHYSICIAN ASSISTANT

## 2024-03-29 PROCEDURE — 99024 POSTOP FOLLOW-UP VISIT: CPT | Performed by: PHYSICIAN ASSISTANT

## 2024-03-29 NOTE — PROGRESS NOTES
Subjective   Patient ID: Vincent Berardinelli is a 63 y.o. male who presents for Post-op (Robotic bilateral inguinal hernia repair done on 03/15/24).    HPI  63-year-old gentleman 2-week status post bilateral robotic inguinal hernia repair.  Patient is doing well without complaints.  Minimal pain no nausea no vomiting.  Moving his bowels daily eating well.  Walking daily.      Review of Systems  Review of systems is negative other than what is mentioned above      Physical Exam  Eyes: Conjunctiva non -icteric and eye lids are without obvious rash or drooping. Pupils are symmetric.   Ears, Nose, Mouth, and Throat: External ears and nose appear to be without deformity or rash. No lesions or masses noted. Hearing is grossly intact.   Neck:. No JVD noted, tracheal position is midline. No thyromegaly, no thyroid nodules  Head and Face: Examination of the head and face revealed no abnormalities.   Respiratory: No gasping or shortness of breath noted, no use of accessory muscles noted.   Cardiovascular: Examination for edema is normal.   GI: Abdomen non tender to palpation, incisions are clean dry and intact no erythema no swelling no drainage  Skin: No rashes or open lesions/ulcers identified on skin.   Musk: Digits/nails show no clubbing or cyanosis. No asymmetry or masses noted of the musculature. Examination of the muscles/joints/bones show normal range of motion. Gait is grossly normally.   Neurologic: Cranial nerves II- XII intact, motor strength 5/5 muscle strength of the lower extremities bilaterally and equal.      Objective     No diagnosis found.   Patient Active Problem List   Diagnosis    Bilateral vocal cord paresis    Hoarseness      No Known Allergies   Medication Documentation Review Audit       Reviewed by Thu Munguia MA (Medical Assistant) on 03/29/24 at 0841      Medication Order Taking? Sig Documenting Provider Last Dose Status   ipratropium (Atrovent) 21 mcg (0.03 %) nasal spray [id number] No  Administer 2 sprays into each nostril 3 times a day. Historical Provider, MD Taking Active   metoprolol succinate XL (Toprol-XL) 25 mg 24 hr tablet [id number] No Take 1 tablet (25 mg) by mouth once daily. Historical Provider, MD Taking Active                    History reviewed. No pertinent past medical history.  Social History     Tobacco Use   Smoking Status Never   Smokeless Tobacco Never     No family history on file.   Past Surgical History:   Procedure Laterality Date    CHOLECYSTECTOMY         Assessment/Plan   No lifting over 10 to 12 pounds for 4 weeks  No swimming pools hot tubs or lakes for 2 weeks  You may ride a stationary bike, you may use a treadmill, you may walk outside.  No squats, sit ups or lunges  You may drive a car  Follow-up as needed    Encounter Diagnosis   Name Primary?    Status post bilateral inguinal hernia repair, follow-up exam Yes     I have reviewed all data including labs,radiologic and previous reports.          **Portions of this medical record have been created using voice recognition software and may have minor errors which are inherent in voice recognition systems. It has not been fully edited for typographical or grammatical errors**

## 2024-05-13 ENCOUNTER — PROCEDURE VISIT (OUTPATIENT)
Dept: OTOLARYNGOLOGY | Facility: CLINIC | Age: 64
End: 2024-05-13
Payer: COMMERCIAL

## 2024-05-13 DIAGNOSIS — R49.0 HOARSENESS: ICD-10-CM

## 2024-05-13 DIAGNOSIS — J38.02 BILATERAL VOCAL CORD PARESIS: Primary | ICD-10-CM

## 2024-05-13 PROCEDURE — 31574 LARGSC W/NJX AUGMENTATION: CPT | Performed by: OTOLARYNGOLOGY

## 2024-05-13 ASSESSMENT — PATIENT HEALTH QUESTIONNAIRE - PHQ9
1. LITTLE INTEREST OR PLEASURE IN DOING THINGS: NOT AT ALL
SUM OF ALL RESPONSES TO PHQ9 QUESTIONS 1 AND 2: 0
2. FEELING DOWN, DEPRESSED OR HOPELESS: NOT AT ALL

## 2024-05-13 NOTE — PROGRESS NOTES
Chief Complaint  Chief Complaint   Patient presents with    VC injection        Pertinent History:  Consult from Dr. Solomon for  hoarseness, throat clearing, cough, and runny nose with clinical findings of bilateral vocal cord paresis.     Interval History  He is here for a vocal cord injeciton.     Procedure:  Direct endoscopic vocal cord injection (CPT 04462) bilateral      Diagnosis:  Bilateral vocal cord paresis.      Injection material:  Restylane      After informed consent, a time out was done, and appropriate topical Afrin and lidocaine spray was applied to the nares and throat. The channeled endoscope was advanced. The topical lidocaine gargle was applied to the vocal cords. A 25 g needle was advanced via the thyrohyoid space and angled to the affected vocal fold. The injection was placed lateral to the vocal ligament and was completed with the following details:      0.5 ml to the right and 0.5 ml to the left for a total of 1 ml.      Patient tolerated the procedure well.   Post procedure instructions were given for 24 hours of voice rest and avoidance of cough and clearing throat were discussed.       Assessment and Plan:  This is a follow up visit for chronic hoarseness and bilateral vocal cord paresis.    He tolerated an office based injection of 0.5 ml bilaterally. He will follow up with me in 2 months.     The patient's questions were answered.    Scribe Attestation  By signing my name below, I, Nessa Choe , [place] attest that this documentation has been prepared under the direction and in the presence of Brigida Gee MD.

## 2024-05-28 ENCOUNTER — TREATMENT (OUTPATIENT)
Dept: SPEECH THERAPY | Facility: CLINIC | Age: 64
End: 2024-05-28
Payer: COMMERCIAL

## 2024-05-28 DIAGNOSIS — R49.0 HOARSENESS: ICD-10-CM

## 2024-05-28 DIAGNOSIS — J38.02 BILATERAL VOCAL CORD PARESIS: Primary | ICD-10-CM

## 2024-05-28 NOTE — PROGRESS NOTES
Speech-Language Pathology                 Therapy Communication Note    Patient Name: Vincent Berardinelli  MRN: [id number]  Today's Date: 5/28/2024     Discipline: Speech Language Pathology    Comment: The patient attended his visit this date to let clinician know that he's very happy with his voice since his bilateral vocal cord injections on 5/13/2024. He states he does not feel effort with using his voice and is not vocally fatigued at the end of the day. He does not feel he needs any voice rebalancing exercises. His voice quality was slightly strained at the end of long sentences. This may be 2/2 persistent overcorrection since he is only 2 weeks out or MTD associated with not replenishing the respiratory drive. Encouraged him to replenish his breath when needed and avoid speaking for too long on one breath. Patient in agreement. July follow up visit with Dr. Gee scheduled.

## 2024-07-09 ENCOUNTER — APPOINTMENT (OUTPATIENT)
Dept: OTOLARYNGOLOGY | Facility: CLINIC | Age: 64
End: 2024-07-09
Payer: COMMERCIAL

## 2024-07-09 VITALS — WEIGHT: 201.3 LBS | TEMPERATURE: 97.4 F | HEIGHT: 69 IN | BODY MASS INDEX: 29.82 KG/M2

## 2024-07-09 DIAGNOSIS — J38.02 BILATERAL VOCAL CORD PARESIS: ICD-10-CM

## 2024-07-09 DIAGNOSIS — R49.0 HOARSENESS: Primary | ICD-10-CM

## 2024-07-09 PROCEDURE — 31579 LARYNGOSCOPY TELESCOPIC: CPT | Performed by: OTOLARYNGOLOGY

## 2024-07-09 PROCEDURE — 1036F TOBACCO NON-USER: CPT | Performed by: OTOLARYNGOLOGY

## 2024-07-09 ASSESSMENT — PATIENT HEALTH QUESTIONNAIRE - PHQ9
SUM OF ALL RESPONSES TO PHQ9 QUESTIONS 1 AND 2: 0
1. LITTLE INTEREST OR PLEASURE IN DOING THINGS: NOT AT ALL
2. FEELING DOWN, DEPRESSED OR HOPELESS: NOT AT ALL

## 2024-07-09 NOTE — PROGRESS NOTES
Chief Complaint  Chief Complaint   Patient presents with    Follow-up     Reports that he received an inj last time and it has worked well.         Pertinent History:  Consult from Dr. Solomon for  hoarseness, throat clearing, cough, and runny nose with clinical findings of bilateral vocal cord paresis s/p injection on 2024.      Interval History (2024):   He reports that the injection has been beneficial for him. He has noticed decreased effort and has improved quality.   He has intermittent throat clearing. Happy with current voice quality.     Exam:  VOICE: Improved effort and volume.   RESPIRATION: Breathing comfortably, no stridor.    ORAL CAVITY/OROPHARYNX/LIPS: Normal mucous membranes, normal floor of mouth/tongue/OP, no masses or lesions are noted.    SKIN: Neck skin is without scar or injury.    PSYCH: Alert and oriented with appropriate mood and affect.       PROCEDURE NOTE:  Recommended stroboscopy.  Risks, benefits,  and alternatives were explained. They wished to proceed and provides verbal consent.     PROCEDURE:  Flexible laryngoscopy with stroboscopy, CPT 85654     POSTPROCEDURE DIAGNOSIS: Hoarseness     INDICATIONS: Inability to tolerate mirror exam or abnormal findings on mirror, Flexible Laryngoscopy/Stroboscopy performed to assess one of the followin. Diagnosis of symptomatic disorder involving the voice, swallow, upper aerodigestive tract, including ZENIA disorders, or  2. Preoperative evaluation of vocal cord function for individuals undergoing surgery where the RLN or vagus nerves are at risk of injury, or  3. Further evaluation of abnormalities of the upper aerodigestive tract discovered by another modality, such as CT, MRI, bronchoscopy or EGD    Description of Procedure:    After adequate afrin and lidocaine spray, I advanced the endoscope.  Visualization of the nasopharynx, vallecula, posterior pharyngeal walls, pyriform, epiglottis and post cricoid areas was unremarkable.  The  following laryngeal findings were noted:    vocal cord movement was normal   closure was improved   Mucosal wave increased on the right > left   Compression was improved   Erythema in the mid cords.   the subglottis was widely patent  Pharyngeal wall squeeze was normal     Procedure well tolerated.     Assessment and Plan:  This is a follow up visit for  chronic hoarseness and bilateral vocal cord paresis s/p vocal cord injection on 05/13/2024. He has improved vocal quality and is happy with his results.     We discussed:  At this time, there are no restrictions to his voice. He will decrease volume as needed.   He will follow up in 6 months or as needed.     The patient's questions were answered.    Scribe Attestation  By signing my name below, I, Nessa Choe , Scribe attest that this documentation has been prepared under the direction and in the presence of Brigida Gee MD.

## 2025-10-31 ENCOUNTER — APPOINTMENT (OUTPATIENT)
Dept: PRIMARY CARE | Facility: CLINIC | Age: 65
End: 2025-10-31
Payer: COMMERCIAL